# Patient Record
(demographics unavailable — no encounter records)

---

## 2025-01-06 NOTE — ASSESSMENT
[FreeTextEntry1] :  Counseled re weight management.  Dietary Modification Education provided. Recommend a diet high in vegetables intake & lean protein. Track dietary intake daily to increase awareness of dietary intake. Protein goal: 120 mg per day.   Physical Activity- recommend incorporating strength training 3 times per week  F/U with RD at school   RTO as needed

## 2025-01-06 NOTE — HISTORY OF PRESENT ILLNESS
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  Sees Psychiatrist & Therapist. To start back on Adderall.     Started struggling with weight 1 yr ago, after she had pericarditis. Has attempted lifestyle changes. Seeing RD in school.  Gained 15 lbs in past yr.  Highest Adult Wt: 145 lbs  Lowest Adult Wt: 130  lbs   Dietary pattern: 2 meals per day  B-egg white bites/eggs OR skips  D-cooks dinner  S-has cut back on snacking  Not binging  Easier to stay on track while at home. Water Intake: poor    Exercise Regimen: No restrictions for heart; cardio 3 times a week. Occasional weight training.  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